# Patient Record
Sex: MALE | Race: BLACK OR AFRICAN AMERICAN | Employment: STUDENT | URBAN - NONMETROPOLITAN AREA
[De-identification: names, ages, dates, MRNs, and addresses within clinical notes are randomized per-mention and may not be internally consistent; named-entity substitution may affect disease eponyms.]

---
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
## 2021-08-26 ENCOUNTER — OFFICE VISIT (OUTPATIENT)
Dept: PRIMARY CARE CLINIC | Age: 21
End: 2021-08-26
Payer: COMMERCIAL

## 2021-08-26 VITALS
DIASTOLIC BLOOD PRESSURE: 92 MMHG | SYSTOLIC BLOOD PRESSURE: 142 MMHG | BODY MASS INDEX: 23.1 KG/M2 | OXYGEN SATURATION: 98 % | HEART RATE: 84 BPM | HEIGHT: 71 IN | WEIGHT: 165 LBS

## 2021-08-26 DIAGNOSIS — R03.0 ELEVATED BLOOD PRESSURE READING: Primary | ICD-10-CM

## 2021-08-26 PROCEDURE — 99204 OFFICE O/P NEW MOD 45 MIN: CPT | Performed by: STUDENT IN AN ORGANIZED HEALTH CARE EDUCATION/TRAINING PROGRAM

## 2021-08-26 NOTE — PROGRESS NOTES
CC:   Chief Complaint   Patient presents with    Hypertension     High bp during sports physical       HPI  Prateek Grey is a 22 y.o. male here for a new patient appointment for high blood pressure at sports physical.  He had his annual sports physical the other day and his blood pressure on two separate readings was 155/94. Because of this he was not cleared to play until his blood pressure was further evaluated, rechecked, and managed as needed. He states his mom and dad have high blood pressure but doesn't know any siblings having high blood pressure. He denies frequent headaches, chest pain, shortness of breath, leg swelling. He denies any stretch marks or frequent cramping. Review of Systems   As above    Vitals:    08/26/21 1059   BP: (!) 142/92   Pulse: 84   SpO2: 98%   Weight: 165 lb (74.8 kg)   Height: 5' 10.75\" (1.797 m)     Physical Exam  Vitals reviewed. Constitutional:       General: He is not in acute distress. Appearance: He is not ill-appearing or toxic-appearing. HENT:      Head: Normocephalic and atraumatic. Eyes:      General: No scleral icterus. Extraocular Movements: Extraocular movements intact. Cardiovascular:      Rate and Rhythm: Normal rate and regular rhythm. Pulses: Normal pulses. Heart sounds: No murmur heard. No friction rub. No gallop. Comments: No carotid or abdominal bruit  Pulmonary:      Effort: No respiratory distress. Breath sounds: Normal breath sounds. No wheezing, rhonchi or rales. Abdominal:      General: There is no distension. Palpations: Abdomen is soft. Tenderness: There is no abdominal tenderness. Comments: No abdominal bruits   Musculoskeletal:      Cervical back: Normal range of motion. Right lower leg: No edema. Left lower leg: No edema. Skin:     General: Skin is warm and dry. Comments: No striae noted   Neurological:      General: No focal deficit present.       Mental Status: He is alert and oriented to person, place, and time. Psychiatric:         Mood and Affect: Mood normal.         Behavior: Behavior normal.         A/P  1. Elevated blood pressure reading  Has been found to have elevated blood pressure readings on two occasions now. However his blood pressure today isn't so high that I have to withhold him from sport. He may return to participation as long as he isn't having symptoms. However we do need to manage his blood pressure should it remain elevated. We will have him check it in clinic and will have him come back next Tuesday or we will recheck it and get an EKG. -Have ordered external labs for BMP, urinalysis, protein/creatinine ratio, and renal/aldosterone ratio  -Follow-up in 1 week    Orville Habermann, MD  Internal Medicine and Sports Medicine    Please note that this chart was at least partially generated using dragon dictation software. Although every effort was made to ensure the accuracy of this automated transcription, some errors in transcription may have occurred.